# Patient Record
Sex: FEMALE | URBAN - METROPOLITAN AREA
[De-identification: names, ages, dates, MRNs, and addresses within clinical notes are randomized per-mention and may not be internally consistent; named-entity substitution may affect disease eponyms.]

---

## 2020-10-06 ENCOUNTER — OFFICE VISIT (OUTPATIENT)
Dept: URBAN - METROPOLITAN AREA CLINIC 113 | Facility: CLINIC | Age: 55
End: 2020-10-06
Payer: OTHER GOVERNMENT

## 2020-10-06 ENCOUNTER — WEB ENCOUNTER (OUTPATIENT)
Dept: URBAN - METROPOLITAN AREA CLINIC 113 | Facility: CLINIC | Age: 55
End: 2020-10-06

## 2020-10-06 VITALS
BODY MASS INDEX: 23.75 KG/M2 | WEIGHT: 121 LBS | RESPIRATION RATE: 18 BRPM | DIASTOLIC BLOOD PRESSURE: 71 MMHG | SYSTOLIC BLOOD PRESSURE: 105 MMHG | TEMPERATURE: 98.2 F | HEART RATE: 47 BPM | HEIGHT: 60 IN

## 2020-10-06 DIAGNOSIS — R74.8 ELEVATED LIVER ENZYMES: ICD-10-CM

## 2020-10-06 PROCEDURE — 99203 OFFICE O/P NEW LOW 30 MIN: CPT | Performed by: PHYSICIAN ASSISTANT

## 2020-10-06 PROCEDURE — G8427 DOCREV CUR MEDS BY ELIG CLIN: HCPCS | Performed by: PHYSICIAN ASSISTANT

## 2020-10-06 PROCEDURE — 1036F TOBACCO NON-USER: CPT | Performed by: PHYSICIAN ASSISTANT

## 2020-10-06 RX ORDER — VALACYCLOVIR 500 MG/1
1 TABLET TABLET, FILM COATED ORAL ONCE A DAY
Status: ACTIVE | COMMUNITY
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
## 2020-10-13 ENCOUNTER — LAB OUTSIDE AN ENCOUNTER (OUTPATIENT)
Dept: URBAN - METROPOLITAN AREA CLINIC 113 | Facility: CLINIC | Age: 55
End: 2020-10-13

## 2020-10-26 ENCOUNTER — OFFICE VISIT (OUTPATIENT)
Dept: URBAN - METROPOLITAN AREA CLINIC 113 | Facility: CLINIC | Age: 55
End: 2020-10-26
Payer: OTHER GOVERNMENT

## 2020-10-26 VITALS
BODY MASS INDEX: 23.56 KG/M2 | HEART RATE: 46 BPM | HEIGHT: 60 IN | TEMPERATURE: 97.7 F | WEIGHT: 120 LBS | RESPIRATION RATE: 18 BRPM | DIASTOLIC BLOOD PRESSURE: 76 MMHG | SYSTOLIC BLOOD PRESSURE: 120 MMHG

## 2020-10-26 DIAGNOSIS — R74.8 ELEVATED LIVER ENZYMES: ICD-10-CM

## 2020-10-26 PROCEDURE — G8427 DOCREV CUR MEDS BY ELIG CLIN: HCPCS | Performed by: PHYSICIAN ASSISTANT

## 2020-10-26 PROCEDURE — 99213 OFFICE O/P EST LOW 20 MIN: CPT | Performed by: PHYSICIAN ASSISTANT

## 2020-10-26 RX ORDER — VALACYCLOVIR 500 MG/1
1 TABLET TABLET, FILM COATED ORAL ONCE A DAY
Status: ACTIVE | COMMUNITY

## 2020-10-26 NOTE — HPI-TODAY'S VISIT:
Ms. Lopez is a 55-year-old female presenting for follow-up regarding elevated liver enzymes. She was seen on 10/6/2020 regarding an elevation in total bilirubin and AST, labs and abdominal ultrasound were ordered. Labs 10/14/2020 : Positive hepatitis A antibody total, negative AMA, negative ANIKA, negative ASMA, normal iron studies, nonreactive hepatitis B surface antigen, nonreactive hepatitis B surface antibody, nonreactive hepatits C antibody, total bilirubin 1.7, direct bilirubin 0.2, indirect bilirubin 1.5, AST 24, ALT 17, . Abdominal ultrasound 10/14/2020 : Normal liver, cholecystectomy. She denies any GI symptoms.  She does not drink alcohol.  She is up to date on colonoscopy.

## 2021-04-15 ENCOUNTER — OFFICE VISIT (OUTPATIENT)
Dept: URBAN - METROPOLITAN AREA CLINIC 107 | Facility: CLINIC | Age: 56
End: 2021-04-15
Payer: OTHER GOVERNMENT

## 2021-04-15 VITALS
RESPIRATION RATE: 18 BRPM | WEIGHT: 120 LBS | DIASTOLIC BLOOD PRESSURE: 69 MMHG | BODY MASS INDEX: 23.56 KG/M2 | HEIGHT: 60 IN | TEMPERATURE: 98.9 F | SYSTOLIC BLOOD PRESSURE: 112 MMHG | HEART RATE: 48 BPM

## 2021-04-15 DIAGNOSIS — R74.8 ELEVATED LIVER ENZYMES: ICD-10-CM

## 2021-04-15 PROBLEM — 707724006 ELEVATED LIVER ENZYMES LEVEL: Status: ACTIVE | Noted: 2020-10-06

## 2021-04-15 PROCEDURE — 99213 OFFICE O/P EST LOW 20 MIN: CPT | Performed by: INTERNAL MEDICINE

## 2021-04-15 RX ORDER — VALACYCLOVIR 500 MG/1
1 TABLET TABLET, FILM COATED ORAL ONCE A DAY
Status: ACTIVE | COMMUNITY
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
## 2021-04-15 NOTE — EXAM-PHYSICAL EXAM
She is alert and oriented to person place and situation in no acute distress.  Abdomen is soft nondistended and nontender.

## 2022-01-28 ENCOUNTER — TELEPHONE ENCOUNTER (OUTPATIENT)
Dept: URBAN - METROPOLITAN AREA CLINIC 40 | Facility: CLINIC | Age: 57
End: 2022-01-28

## 2022-06-10 ENCOUNTER — CLAIMS CREATED FROM THE CLAIM WINDOW (OUTPATIENT)
Dept: URBAN - METROPOLITAN AREA CLINIC 107 | Facility: CLINIC | Age: 57
End: 2022-06-10
Payer: OTHER GOVERNMENT

## 2022-06-10 VITALS
RESPIRATION RATE: 18 BRPM | SYSTOLIC BLOOD PRESSURE: 98 MMHG | WEIGHT: 118 LBS | TEMPERATURE: 98.4 F | DIASTOLIC BLOOD PRESSURE: 64 MMHG | BODY MASS INDEX: 23.16 KG/M2 | HEART RATE: 59 BPM | HEIGHT: 60 IN

## 2022-06-10 DIAGNOSIS — Z80.0 FAMILY HISTORY OF COLON CANCER: ICD-10-CM

## 2022-06-10 DIAGNOSIS — R17 ELEVATED BILIRUBIN: ICD-10-CM

## 2022-06-10 DIAGNOSIS — R74.8 ELEVATED ALKALINE PHOSPHATASE LEVEL: ICD-10-CM

## 2022-06-10 DIAGNOSIS — R97.0 ELEVATED CEA: ICD-10-CM

## 2022-06-10 PROBLEM — 445201008: Status: ACTIVE | Noted: 2022-06-10

## 2022-06-10 PROCEDURE — 99214 OFFICE O/P EST MOD 30 MIN: CPT

## 2022-06-10 RX ORDER — VALACYCLOVIR 500 MG/1
1 TABLET TABLET, FILM COATED ORAL ONCE A DAY
Status: ACTIVE | COMMUNITY

## 2022-06-10 NOTE — HPI-OTHER HISTORIES
Labs 10/14/2020 : Positive hepatitis A antibody total, negative AMA, negative ANIKA, negative ASMA, normal iron studies, nonreactive hepatitis B surface antigen, nonreactive hepatitis B surface antibody, nonreactive hepatits C antibody, total bilirubin 1.7, direct bilirubin 0.2, indirect bilirubin 1.5, AST 24, ALT 17, .  Abdominal ultrasound 10/14/2020 : Normal liver, cholecystectomy.  Labs 9/26/2020 : 30 bilirubin 2.7, alkaline phosphatase 111, AST 39, ALT 34. Labs 1/8/2020 : Total bilirubin 1.7, alkaline phosphatase 73, AST 17, ALT 10. Labs 10/1/2019 : Total bilirubin 1.6, alkaline phosphatase 67, AST 14, ALT 7. Labs 5/ 10/19 : TB 1.0, ALP 70, AST 17, ALT 11.
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
## 2022-06-16 LAB
ALBUMIN: 4.5
ALKALINE PHOSPHATASE: 123
ALT (SGPT): 15
AST (SGOT): 22
BILIRUBIN, DIRECT: 0.23
BILIRUBIN, TOTAL: 1.2
CEA: 3.5
GGT: 12
PROTEIN, TOTAL: 6.6

## 2022-06-17 ENCOUNTER — OFFICE VISIT (OUTPATIENT)
Dept: URBAN - METROPOLITAN AREA CLINIC 107 | Facility: CLINIC | Age: 57
End: 2022-06-17

## 2022-06-17 ENCOUNTER — TELEPHONE ENCOUNTER (OUTPATIENT)
Dept: URBAN - METROPOLITAN AREA CLINIC 113 | Facility: CLINIC | Age: 57
End: 2022-06-17

## 2022-06-17 RX ORDER — VALACYCLOVIR 500 MG/1
1 TABLET TABLET, FILM COATED ORAL ONCE A DAY
Status: ACTIVE | COMMUNITY
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
## 2022-06-20 ENCOUNTER — TELEPHONE ENCOUNTER (OUTPATIENT)
Dept: URBAN - METROPOLITAN AREA CLINIC 113 | Facility: CLINIC | Age: 57
End: 2022-06-20

## 2022-08-12 ENCOUNTER — OFFICE VISIT (OUTPATIENT)
Dept: URBAN - METROPOLITAN AREA CLINIC 107 | Facility: CLINIC | Age: 57
End: 2022-08-12

## 2023-07-26 ENCOUNTER — OFFICE VISIT (OUTPATIENT)
Dept: URBAN - METROPOLITAN AREA CLINIC 113 | Facility: CLINIC | Age: 58
End: 2023-07-26
Payer: OTHER GOVERNMENT

## 2023-07-26 VITALS
DIASTOLIC BLOOD PRESSURE: 61 MMHG | SYSTOLIC BLOOD PRESSURE: 104 MMHG | BODY MASS INDEX: 23.36 KG/M2 | WEIGHT: 119 LBS | HEART RATE: 53 BPM | TEMPERATURE: 97.5 F | HEIGHT: 60 IN | RESPIRATION RATE: 14 BRPM

## 2023-07-26 DIAGNOSIS — R74.8 ELEVATED ALKALINE PHOSPHATASE LEVEL: ICD-10-CM

## 2023-07-26 DIAGNOSIS — R17 ELEVATED BILIRUBIN: ICD-10-CM

## 2023-07-26 DIAGNOSIS — Z80.0 FAMILY HISTORY OF COLON CANCER: ICD-10-CM

## 2023-07-26 PROCEDURE — 99244 OFF/OP CNSLTJ NEW/EST MOD 40: CPT | Performed by: INTERNAL MEDICINE

## 2023-07-26 RX ORDER — VALACYCLOVIR 500 MG/1
1 TABLET TABLET, FILM COATED ORAL ONCE A DAY
Status: ACTIVE | COMMUNITY
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
## 2023-07-26 NOTE — HPI-OTHER HISTORIES
Labs 6/13/2022:Normal TB, elevated , normal AST, normal ALT, normal CEA, normal GGT.  Labs 5/25/2022: elevated TB 2.0, normal direct bilirubin, elevated , normal AST/ALT. Labs 4/27/2022: Elevated hemoglobin 14.4, elevated hematocrit 42.2, normal BMP. Labs 3/28/2022: Normal CA 19-9, normal , elevated CEA.  Labs 10/14/2020 : Positive hepatitis A antibody total, negative AMA, negative ANIKA, negative ASMA, normal iron studies, nonreactive hepatitis B surface antigen, nonreactive hepatitis B surface antibody, nonreactive hepatits C antibody, total bilirubin 1.7, direct bilirubin 0.2, indirect bilirubin 1.5, AST 24, ALT 17, .  Abdominal ultrasound 10/14/2020 : Normal liver, cholecystectomy.  Labs 9/26/2020 : 30 bilirubin 2.7, alkaline phosphatase 111, AST 39, ALT 34. Labs 1/8/2020 : Total bilirubin 1.7, alkaline phosphatase 73, AST 17, ALT 10. Labs 10/1/2019 : Total bilirubin 1.6, alkaline phosphatase 67, AST 14, ALT 7. Labs 5/ 10/19 : TB 1.0, ALP 70, AST 17, ALT 11.

## 2023-07-27 LAB
ABSOLUTE BASOPHILS: 29
ABSOLUTE EOSINOPHILS: 82
ABSOLUTE LYMPHOCYTES: 1259
ABSOLUTE MONOCYTES: 349
ABSOLUTE NEUTROPHILS: 2382
ALBUMIN/GLOBULIN RATIO: 2.2
ALBUMIN: 4.1
ALKALINE PHOSPHATASE: 115
ALT (SGPT): 13
AST (SGOT): 18
BASOPHILS: 0.7
BILIRUBIN, DIRECT: 0.2
BILIRUBIN, INDIRECT: 1.1
BILIRUBIN, TOTAL: 1.3
EOSINOPHILS: 2
GLOBULIN: 1.9
HEMATOCRIT: 42.7
HEMOGLOBIN: 14.6
LYMPHOCYTES: 30.7
MCH: 31.7
MCHC: 34.2
MCV: 92.8
MONOCYTES: 8.5
MPV: 10.2
NEUTROPHILS: 58.1
PLATELET COUNT: 180
PROTEIN, TOTAL: 6
RDW: 12.9
RED BLOOD CELL COUNT: 4.6
WHITE BLOOD CELL COUNT: 4.1

## 2023-08-07 ENCOUNTER — TELEPHONE ENCOUNTER (OUTPATIENT)
Dept: URBAN - METROPOLITAN AREA CLINIC 23 | Facility: CLINIC | Age: 58
End: 2023-08-07

## 2023-10-11 ENCOUNTER — OFFICE VISIT (OUTPATIENT)
Dept: URBAN - METROPOLITAN AREA CLINIC 113 | Facility: CLINIC | Age: 58
End: 2023-10-11
Payer: OTHER GOVERNMENT

## 2023-10-11 ENCOUNTER — LAB OUTSIDE AN ENCOUNTER (OUTPATIENT)
Dept: URBAN - METROPOLITAN AREA CLINIC 113 | Facility: CLINIC | Age: 58
End: 2023-10-11

## 2023-10-11 ENCOUNTER — DASHBOARD ENCOUNTERS (OUTPATIENT)
Age: 58
End: 2023-10-11

## 2023-10-11 VITALS
HEIGHT: 60 IN | SYSTOLIC BLOOD PRESSURE: 105 MMHG | TEMPERATURE: 97.8 F | RESPIRATION RATE: 16 BRPM | WEIGHT: 119 LBS | HEART RATE: 51 BPM | DIASTOLIC BLOOD PRESSURE: 68 MMHG | BODY MASS INDEX: 23.36 KG/M2

## 2023-10-11 DIAGNOSIS — R97.0 ELEVATED CEA: ICD-10-CM

## 2023-10-11 DIAGNOSIS — Z80.0 FAMILY HISTORY OF COLON CANCER: ICD-10-CM

## 2023-10-11 DIAGNOSIS — R17 ELEVATED BILIRUBIN: ICD-10-CM

## 2023-10-11 DIAGNOSIS — R74.8 ELEVATED LIVER ENZYMES: ICD-10-CM

## 2023-10-11 PROCEDURE — 99214 OFFICE O/P EST MOD 30 MIN: CPT | Performed by: INTERNAL MEDICINE

## 2023-10-11 RX ORDER — VALACYCLOVIR 500 MG/1
1 TABLET TABLET, FILM COATED ORAL ONCE A DAY
Status: ACTIVE | COMMUNITY
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
## 2023-10-20 ENCOUNTER — TELEPHONE ENCOUNTER (OUTPATIENT)
Dept: URBAN - METROPOLITAN AREA CLINIC 113 | Facility: CLINIC | Age: 58
End: 2023-10-20

## 2023-10-23 ENCOUNTER — TELEPHONE ENCOUNTER (OUTPATIENT)
Dept: URBAN - METROPOLITAN AREA CLINIC 113 | Facility: CLINIC | Age: 58
End: 2023-10-23

## 2023-10-25 ENCOUNTER — TELEPHONE ENCOUNTER (OUTPATIENT)
Dept: URBAN - METROPOLITAN AREA CLINIC 113 | Facility: CLINIC | Age: 58
End: 2023-10-25

## 2024-09-23 ENCOUNTER — TELEPHONE ENCOUNTER (OUTPATIENT)
Dept: URBAN - METROPOLITAN AREA CLINIC 113 | Facility: CLINIC | Age: 59
End: 2024-09-23

## 2024-09-23 ENCOUNTER — OFFICE VISIT (OUTPATIENT)
Dept: URBAN - METROPOLITAN AREA CLINIC 113 | Facility: CLINIC | Age: 59
End: 2024-09-23
Payer: OTHER GOVERNMENT

## 2024-09-23 VITALS
DIASTOLIC BLOOD PRESSURE: 56 MMHG | TEMPERATURE: 98.2 F | WEIGHT: 117.6 LBS | HEART RATE: 59 BPM | SYSTOLIC BLOOD PRESSURE: 90 MMHG | BODY MASS INDEX: 23.09 KG/M2 | RESPIRATION RATE: 18 BRPM | HEIGHT: 60 IN

## 2024-09-23 DIAGNOSIS — K75.81 NASH (NONALCOHOLIC STEATOHEPATITIS): ICD-10-CM

## 2024-09-23 DIAGNOSIS — R17 ELEVATED BILIRUBIN: ICD-10-CM

## 2024-09-23 DIAGNOSIS — E80.4 GILBERT SYNDROME: ICD-10-CM

## 2024-09-23 DIAGNOSIS — R97.0 ELEVATED CEA: ICD-10-CM

## 2024-09-23 DIAGNOSIS — R74.8 ELEVATED ALKALINE PHOSPHATASE LEVEL: ICD-10-CM

## 2024-09-23 PROBLEM — 442685003: Status: ACTIVE | Noted: 2024-09-23

## 2024-09-23 PROBLEM — 27503000: Status: ACTIVE | Noted: 2024-09-23

## 2024-09-23 PROCEDURE — 99213 OFFICE O/P EST LOW 20 MIN: CPT | Performed by: INTERNAL MEDICINE

## 2024-09-23 RX ORDER — VALACYCLOVIR 500 MG/1
1 TABLET TABLET, FILM COATED ORAL ONCE A DAY
Status: ACTIVE | COMMUNITY
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
## 2024-10-15 ENCOUNTER — TELEPHONE ENCOUNTER (OUTPATIENT)
Dept: URBAN - METROPOLITAN AREA CLINIC 113 | Facility: CLINIC | Age: 59
End: 2024-10-15

## 2024-10-24 ENCOUNTER — TELEPHONE ENCOUNTER (OUTPATIENT)
Dept: URBAN - METROPOLITAN AREA CLINIC 113 | Facility: CLINIC | Age: 59
End: 2024-10-24